# Patient Record
Sex: FEMALE | Race: WHITE | NOT HISPANIC OR LATINO | Employment: FULL TIME
[De-identification: names, ages, dates, MRNs, and addresses within clinical notes are randomized per-mention and may not be internally consistent; named-entity substitution may affect disease eponyms.]

---

## 2017-01-03 ENCOUNTER — RECORDS - HEALTHEAST (OUTPATIENT)
Dept: ADMINISTRATIVE | Facility: OTHER | Age: 37
End: 2017-01-03

## 2017-10-15 ENCOUNTER — RECORDS - HEALTHEAST (OUTPATIENT)
Dept: ADMINISTRATIVE | Facility: OTHER | Age: 37
End: 2017-10-15

## 2018-03-13 ENCOUNTER — RECORDS - HEALTHEAST (OUTPATIENT)
Dept: ADMINISTRATIVE | Facility: OTHER | Age: 38
End: 2018-03-13

## 2018-08-07 ENCOUNTER — HOME CARE/HOSPICE - HEALTHEAST (OUTPATIENT)
Dept: HOME HEALTH SERVICES | Facility: HOME HEALTH | Age: 38
End: 2018-08-07

## 2019-09-17 ENCOUNTER — APPOINTMENT (OUTPATIENT)
Age: 39
Setting detail: DERMATOLOGY
End: 2019-09-17

## 2019-09-17 VITALS — WEIGHT: 183 LBS | RESPIRATION RATE: 14 BRPM | HEIGHT: 65 IN

## 2019-09-17 DIAGNOSIS — L71.0 PERIORAL DERMATITIS: ICD-10-CM

## 2019-09-17 PROCEDURE — 99202 OFFICE O/P NEW SF 15 MIN: CPT

## 2019-09-17 PROCEDURE — OTHER COUNSELING: OTHER

## 2019-09-17 PROCEDURE — OTHER PRESCRIPTION: OTHER

## 2019-09-17 RX ORDER — CLINDAMYCIN PHOSPHATE 10 MG/ML
LOTION TOPICAL
Qty: 1 | Refills: 1 | Status: ERX | COMMUNITY
Start: 2019-09-17

## 2019-09-17 NOTE — HPI: RASH
How Severe Is Your Rash?: moderate
Is This A New Presentation, Or A Follow-Up?: Rash
Additional History: She can’t recall a specific new product

## 2019-09-17 NOTE — PROCEDURE: COUNSELING
Patient Specific Counseling (Will Not Stick From Patient To Patient): Will start topicals first as she is breastfeeding. If not improved in a few weeks she may call for an oral antibiotic
Detail Level: Detailed

## 2020-06-29 ENCOUNTER — APPOINTMENT (OUTPATIENT)
Age: 40
Setting detail: DERMATOLOGY
End: 2020-06-30

## 2020-06-29 DIAGNOSIS — L91.8 OTHER HYPERTROPHIC DISORDERS OF THE SKIN: ICD-10-CM

## 2020-06-29 PROBLEM — D48.5 NEOPLASM OF UNCERTAIN BEHAVIOR OF SKIN: Status: ACTIVE | Noted: 2020-06-29

## 2020-06-29 PROCEDURE — OTHER BIOPSY BY SHAVE METHOD: OTHER

## 2020-06-29 PROCEDURE — 99213 OFFICE O/P EST LOW 20 MIN: CPT | Mod: 25

## 2020-06-29 PROCEDURE — OTHER COUNSELING: OTHER

## 2020-06-29 PROCEDURE — 88305 TISSUE EXAM BY PATHOLOGIST: CPT

## 2020-06-29 PROCEDURE — OTHER PATHOLOGY BILLING: OTHER

## 2020-06-29 PROCEDURE — 11102 TANGNTL BX SKIN SINGLE LES: CPT

## 2020-06-29 ASSESSMENT — LOCATION SIMPLE DESCRIPTION DERM: LOCATION SIMPLE: GROIN

## 2020-06-29 ASSESSMENT — LOCATION ZONE DERM: LOCATION ZONE: TRUNK

## 2020-06-29 ASSESSMENT — LOCATION DETAILED DESCRIPTION DERM: LOCATION DETAILED: LEFT SUPRAPUBIC SKIN

## 2020-06-29 NOTE — PROCEDURE: PATHOLOGY BILLING
Immunohistochemistry (45340 and 39035) billing is not performed here. Please use the Immunohistochemistry Stain Billing plan to accomplish this. Immunohistochemistry (29558 and 87831) billing is not performed here. Please use the Immunohistochemistry Stain Billing plan to accomplish this.

## 2020-06-29 NOTE — PROCEDURE: BIOPSY BY SHAVE METHOD
Render In Bullet Format When Appropriate: No
Hemostasis: Drysol
Information: Selecting Yes will display possible errors in your note based on the variables you have selected. This validation is only offered as a suggestion for you. PLEASE NOTE THAT THE VALIDATION TEXT WILL BE REMOVED WHEN YOU FINALIZE YOUR NOTE. IF YOU WANT TO FAX A PRELIMINARY NOTE YOU WILL NEED TO TOGGLE THIS TO 'NO' IF YOU DO NOT WANT IT IN YOUR FAXED NOTE.
Was A Bandage Applied: Yes
Post-Care Instructions: I reviewed with the patient in detail post-care instructions. Patient is to keep the biopsy site dry overnight, and then apply bacitracin twice daily until healed. Patient may apply hydrogen peroxide soaks to remove any crusting.
Type Of Destruction Used: Curettage
Notification Instructions: Patient will be notified of biopsy results. However, patient instructed to call the office if not contacted within 2 weeks.
Anesthesia Volume In Cc (Will Not Render If 0): 0.3
Electrodesiccation Text: The wound bed was treated with electrodesiccation after the biopsy was performed.
Biopsy Method: Dermablade
Consent: Written consent was obtained and risks were reviewed including but not limited to scarring, infection, bleeding, scabbing, incomplete removal, nerve damage and allergy to anesthesia.
Anesthesia Type: 1% lidocaine with epinephrine
Depth Of Biopsy: dermis
X Size Of Lesion In Cm: 0
Cryotherapy Text: The wound bed was treated with cryotherapy after the biopsy was performed.
Detail Level: Detailed
Biopsy Type: H and E
Silver Nitrate Text: The wound bed was treated with silver nitrate after the biopsy was performed.
Wound Care: Petrolatum
Curettage Text: The wound bed was treated with curettage after the biopsy was performed.
Billing Type: Third-Party Bill
Dressing: bandage
Electrodesiccation And Curettage Text: The wound bed was treated with electrodesiccation and curettage after the biopsy was performed.

## 2021-02-05 ENCOUNTER — RECORDS - HEALTHEAST (OUTPATIENT)
Dept: ADMINISTRATIVE | Facility: OTHER | Age: 41
End: 2021-02-05

## 2021-02-15 ENCOUNTER — HOSPITAL ENCOUNTER (OUTPATIENT)
Dept: ULTRASOUND IMAGING | Facility: CLINIC | Age: 41
Discharge: HOME | End: 2021-02-15
Attending: OBSTETRICS & GYNECOLOGY

## 2021-02-15 ENCOUNTER — HOSPITAL ENCOUNTER (OUTPATIENT)
Dept: MAMMOGRAPHY | Facility: CLINIC | Age: 41
Discharge: HOME | End: 2021-02-15
Attending: OBSTETRICS & GYNECOLOGY

## 2021-02-15 DIAGNOSIS — N64.4 BREAST PAIN: ICD-10-CM

## 2021-02-15 DIAGNOSIS — N63.0 BREAST LUMP: ICD-10-CM

## 2022-01-05 ENCOUNTER — HOSPITAL ENCOUNTER (OUTPATIENT)
Dept: NUCLEAR MEDICINE | Facility: HOSPITAL | Age: 42
End: 2022-01-05
Attending: INTERNAL MEDICINE
Payer: COMMERCIAL

## 2022-01-05 DIAGNOSIS — K21.00 GASTROESOPHAGEAL REFLUX DISEASE WITH ESOPHAGITIS WITHOUT HEMORRHAGE: ICD-10-CM

## 2022-01-05 DIAGNOSIS — K31.84 GASTROPARESIS: ICD-10-CM

## 2022-01-05 PROCEDURE — A9541 TC99M SULFUR COLLOID: HCPCS | Performed by: INTERNAL MEDICINE

## 2022-01-05 PROCEDURE — 78264 GASTRIC EMPTYING IMG STUDY: CPT

## 2022-01-05 PROCEDURE — 343N000001 HC RX 343: Performed by: INTERNAL MEDICINE

## 2022-01-05 RX ADMIN — Medication 1 MILLICURIE: at 09:37

## 2022-01-11 ENCOUNTER — OFFICE VISIT - RIVER FALLS (OUTPATIENT)
Dept: FAMILY MEDICINE | Facility: CLINIC | Age: 42
End: 2022-01-11

## 2022-01-12 ENCOUNTER — AMBULATORY - RIVER FALLS (OUTPATIENT)
Dept: FAMILY MEDICINE | Facility: CLINIC | Age: 42
End: 2022-01-12

## 2022-01-12 ENCOUNTER — LAB REQUISITION (OUTPATIENT)
Dept: LAB | Facility: CLINIC | Age: 42
End: 2022-01-12
Payer: COMMERCIAL

## 2022-01-12 DIAGNOSIS — U07.1 COVID-19: ICD-10-CM

## 2022-01-12 PROCEDURE — U0005 INFEC AGEN DETEC AMPLI PROBE: HCPCS | Mod: ORL | Performed by: INTERNAL MEDICINE

## 2022-01-13 LAB — SARS-COV-2 RNA RESP QL NAA+PROBE: POSITIVE

## 2022-03-02 NOTE — TELEPHONE ENCOUNTER
---------------------  From: Brenda Spence LPN (Phone Messages Pool ([id number] - [place]))   To: Heraclio Grady MD;     Sent: 1/18/2022 10:09:26 AM CST  Subject: covid result/letter needed     Phone Message    PCP:   none asked for JDL      Time of Call:  10:00am       Person Calling:  pt  Phone number:  [phone]    Note:   Pt calling asking for her covid results from last week.   Informed pt she tested positive.    Pt says she knew she was going to be positive because she had a positive at home test- she [facility]    Pt says JDL told her that he would have to write her a letter on positive test to be able to travel internationally.    Pt says she is leaving Saturday and would like to  at clinic as soon as she is able.    Pt also wanting a copy of her positive result- education piece created with positive result- just needs to be printed once JDL letter is done.    Last office visit and reason:  1/11/21 telemedicine---------------------  From: Heraclio Grady MD   To: Phone Messages Pool ([id number] - [place]);     Sent: 1/18/2022 1:02:37 PM CST  Subject: RE: covid result/letter needed     Please print letter in chart that I just completed for her.Letter and positive education piece printed.  Left at .  Tried to call pt- VM is full.Tried to call pt again. VM is full.

## 2022-03-02 NOTE — NURSING NOTE
Comprehensive Intake Entered On:  1/11/2022 4:02 PM CST    Performed On:  1/11/2022 3:55 PM CST by Etta Durand               Summary   Chief Complaint :   Verbal consnet given for telephone visit. c/o cough, sore throat, Sob, abdominal pain, chills, HA, muscle aches, fatigue, runny nose that started 1/8/22. 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 Is a healthcare worker    Etta Durand - 1/11/2022 3:55 PM CST   Health Status   Allergies Verified? :   Yes   Medication History Verified? :   No   Medical History Verified? :   Yes   Pre-Visit Planning Status :   N/A   Tobacco Use? :   Never smoker   Etta Durand - 1/11/2022 3:55 PM CST   Consents   Consent for Immunization Exchange :   Consent Granted   Consent for Immunizations to Providers :   Consent Granted   Etta Durand - 1/11/2022 3:55 PM CST   Meds / Allergies   (As Of: 1/11/2022 4:02:54 PM CST)   Allergies (Active)   No Known Medication Allergies  Estimated Onset Date:   Unspecified ; Created By:   Etta Durand; Reaction Status:   Active ; Category:   Drug ; Substance:   No Known Medication Allergies ; Type:   Allergy ; Updated By:   Etta Durand; Reviewed Date:   1/11/2022 3:59 PM CST        Medication List   (As Of: 1/11/2022 4:02:54 PM CST)   Home Meds    omeprazole  :   omeprazole ; Status:   Documented ; Ordered As Mnemonic:   omeprazole 20 mg oral delayed release tablet ; Simple Display Line:   20 mg, 1 tab(s), Oral, bid, 0 Refill(s) ; Catalog Code:   omeprazole ; Order Dt/Tm:   1/11/2022 3:59:48 PM CST          ethinyl estradiol-etonogestrel  :   ethinyl estradiol-etonogestrel ; Status:   Documented ; Ordered As Mnemonic:   NuvaRing 0.120 mg-0.015 mg/24 hours vaginal ring ; Simple Display Line:   1 EA, Vaginal, q4 wks, 0 Refill(s) ; Catalog Code:   ethinyl estradiol-etonogestrel ; Order Dt/Tm:   1/11/2022 4:00:08 PM CST            Social History   Social History   (As Of: 1/11/2022 4:02:54 PM CST)   Tobacco:        Never  (less than 100 in lifetime)   (Last Updated: 1/11/2022 3:59:10 PM CST by Etta Durand)          Electronic Cigarette/Vaping:        Electronic Cigarette Use: Never.   (Last Updated: 1/11/2022 3:59:14 PM CST by Etta Durand)

## 2022-03-02 NOTE — PROGRESS NOTES
Patient Information     Name:ASPEN GARCIA      Address:      [address] [place] [id number]     Sex:Female     YOB: 1980     [phone]     MRN:[id number]     FIN:[id number]     Location:[facility]     Date of Service:01/11/2022      Primary Care Physician:       NONE ,       Attending Physician:       Heraclio Grady MD, [phone]    Dear Sir,    Aspen Fleming developed COVID-19 symptoms 2022-01-08 with a positive COVID-19 PCR test at my clinic on 2022-01-12. Her isolation period will be over today, 2022-01-18.    Sincerely,    [place] [address]    phone [phone]    fax [phone]

## 2022-03-02 NOTE — TELEPHONE ENCOUNTER
---------------------  From: Heraclio Grady MD   To: Appointment Pool ([id number]);     Cc: [facility] Message Pool ([id number] - [place]);      Sent: 1/11/2022 4:21:29 PM CST  Subject: General Message   Actions: Notify the patient for appointment      COVID19 PCRI spoke with pt and transferred to scheduling

## 2022-03-02 NOTE — PROGRESS NOTES
Chief Complaint    Verbal consnet given for telephone visit. c/o cough, sore throat, Sob, abdominal pain, chills, HA, muscle aches, fatigue, runny nose that started 1/8/22. 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 Is a healthcare worker  History of Present Illness       Today's visit was conducted via telephone due to the COVID-19 pandemic.  Patient's consent to telephone visit was obtained and documented.       Call Start Time:  1615       Call End Time:   1625       Patient is a 41-year-old whose only chronic medical problem is acid reflux who had COVID symptom onset January 8, 2022.  She tested positive home test today.  She is concerned because of travel to [facility] for a wedding January 23.  She has cough, sore throat, mild shortness of breath, abdominal pain, chills, headache, muscle aches, fatigue, runny nose.  No vomiting, diarrhea, anosmia, or nausea.  She tells me that she developed acid reflux after her first bout of COVID over a year ago.  Review of Systems       No other complaints or chronic medical problems.  Assessment/Plan       Acute COVID-19 (U07.1)          Does not appear to be candidate for monoclonal or Paxlovid.  We will arrange for COVID PCR.         Ordered:          15799 office o/p new low 30-44 min (Charge), Quantity: 1, Acute COVID-19  Chronic GERD                Chronic GERD (K21.9)          Stable.         Ordered:          86712 office o/p new low 30-44 min (Charge), Quantity: 1, Acute COVID-19  Chronic GERD           Patient Information     Name:ASPEN GARCIA      Address:      [address] [place] [address]     Sex:Female     YOB: 1980     [phone]     MRN:[id number]     FIN:[id number]     Location:[facility]     Date of Service:01/11/2022      Primary Care Physician:       NONE ,       Attending Physician:       Heraclio Grady MD, [phone]  Problem List/Past Medical History    Ongoing     Chronic GERD    Historical     No  qualifying data  Medications    NuvaRing 0.120 mg-0.015 mg/24 hours vaginal ring, 1 EA, Vaginal, q4 wks    omeprazole 20 mg oral delayed release tablet, 20 mg= 1 tab(s), Oral, bid  Allergies    No Known Medication Allergies  Social History    Smoking Status     Never smoker     Electronic Cigarette/Vaping      Electronic Cigarette Use: Never.     Tobacco      Never (less than 100 in lifetime)  Immunizations      No Immunizations recorded for patient.

## 2022-03-02 NOTE — TELEPHONE ENCOUNTER
---------------------  From: Shoaib VINCENTJalyn   Sent: 1/12/2022 2:35:18 PM CST  Subject: Curbside Testing     Pt was seen at [facility] today for covid testing per Dr. Grady. 02 Sat=99%. Pt resides in [facility] ([place], [place]). Will fax results to [facility] if positive.

## 2022-04-02 ENCOUNTER — HEALTH MAINTENANCE LETTER (OUTPATIENT)
Age: 42
End: 2022-04-02

## 2022-10-01 ENCOUNTER — HEALTH MAINTENANCE LETTER (OUTPATIENT)
Age: 42
End: 2022-10-01

## 2023-04-18 ENCOUNTER — APPOINTMENT (OUTPATIENT)
Dept: URBAN - METROPOLITAN AREA CLINIC 260 | Age: 43
Setting detail: DERMATOLOGY
End: 2023-04-18

## 2023-04-18 DIAGNOSIS — D49.2 NEOPLASM OF UNSPECIFIED BEHAVIOR OF BONE, SOFT TISSUE, AND SKIN: ICD-10-CM

## 2023-04-18 DIAGNOSIS — L82.1 OTHER SEBORRHEIC KERATOSIS: ICD-10-CM

## 2023-04-18 DIAGNOSIS — L81.4 OTHER MELANIN HYPERPIGMENTATION: ICD-10-CM

## 2023-04-18 PROCEDURE — OTHER PHOTO-DOCUMENTATION: OTHER

## 2023-04-18 PROCEDURE — OTHER PRESCRIPTION MEDICATION MANAGEMENT: OTHER

## 2023-04-18 PROCEDURE — OTHER ADDITIONAL NOTES: OTHER

## 2023-04-18 PROCEDURE — 99213 OFFICE O/P EST LOW 20 MIN: CPT

## 2023-04-18 PROCEDURE — OTHER COUNSELING: OTHER

## 2023-04-18 PROCEDURE — OTHER PRESCRIPTION: OTHER

## 2023-04-18 PROCEDURE — OTHER MIPS QUALITY: OTHER

## 2023-04-18 RX ORDER — HYDROQUINONE 4 %
CREAM (GRAM) TOPICAL BID
Qty: 30 | Refills: 2 | Status: ERX | COMMUNITY
Start: 2023-04-18

## 2023-04-18 ASSESSMENT — LOCATION DETAILED DESCRIPTION DERM
LOCATION DETAILED: LEFT PROXIMAL DORSAL FOREARM
LOCATION DETAILED: RIGHT PROXIMAL DORSAL FOREARM
LOCATION DETAILED: RIGHT INFERIOR LATERAL MALAR CHEEK
LOCATION DETAILED: RIGHT FOREHEAD

## 2023-04-18 ASSESSMENT — LOCATION ZONE DERM
LOCATION ZONE: FACE
LOCATION ZONE: ARM

## 2023-04-18 ASSESSMENT — LOCATION SIMPLE DESCRIPTION DERM
LOCATION SIMPLE: RIGHT FOREARM
LOCATION SIMPLE: RIGHT FOREHEAD
LOCATION SIMPLE: LEFT FOREARM
LOCATION SIMPLE: RIGHT CHEEK

## 2023-04-18 NOTE — PROCEDURE: PHOTO-DOCUMENTATION
Photo Preface (Leave Blank If You Do Not Want): Photographs were obtained today to monitor
Detail Level: Detailed
Details (Free Text): L Arm

## 2023-04-18 NOTE — PROCEDURE: ADDITIONAL NOTES
Detail Level: Detailed
Render Risk Assessment In Note?: no
Additional Notes: Discussed cosmetic LN2 treatment for the forearms as well as pricing for 2-10. Patient is going to consider this treatment and schedule a separate appointment if she would like to proceed.
Additional Notes: she prefers a cream for the face as opposed to LN2 although this was discussed
Additional Notes: Patient is going to take a photograph when it is inflamed and irritated as it presents as healing today, and send it to us through the portal.\\nDiscussed a possible punch biopsy, patient will consider this at the NOV

## 2023-05-03 ENCOUNTER — APPOINTMENT (OUTPATIENT)
Dept: URBAN - METROPOLITAN AREA CLINIC 260 | Age: 43
Setting detail: DERMATOLOGY
End: 2023-05-03

## 2023-05-03 DIAGNOSIS — L81.4 OTHER MELANIN HYPERPIGMENTATION: ICD-10-CM

## 2023-05-03 PROCEDURE — OTHER PRESCRIPTION MEDICATION MANAGEMENT: OTHER

## 2023-05-03 PROCEDURE — OTHER MIPS QUALITY: OTHER

## 2023-05-03 PROCEDURE — OTHER PRESCRIPTION: OTHER

## 2023-05-03 PROCEDURE — OTHER ADDITIONAL NOTES: OTHER

## 2023-05-03 PROCEDURE — OTHER BENIGN DESTRUCTION COSMETIC: OTHER

## 2023-05-03 PROCEDURE — OTHER COUNSELING: OTHER

## 2023-05-03 RX ORDER — HYDROQUINONE 4 %
CREAM (GRAM) TOPICAL BID
Qty: 30 | Refills: 2 | Status: ERX

## 2023-05-03 ASSESSMENT — LOCATION SIMPLE DESCRIPTION DERM
LOCATION SIMPLE: RIGHT WRIST
LOCATION SIMPLE: RIGHT FOREHEAD
LOCATION SIMPLE: LEFT HAND
LOCATION SIMPLE: RIGHT CHEEK
LOCATION SIMPLE: RIGHT FOREARM
LOCATION SIMPLE: LEFT FOREARM

## 2023-05-03 ASSESSMENT — LOCATION ZONE DERM
LOCATION ZONE: HAND
LOCATION ZONE: FACE
LOCATION ZONE: ARM

## 2023-05-03 ASSESSMENT — LOCATION DETAILED DESCRIPTION DERM
LOCATION DETAILED: RIGHT DORSAL WRIST
LOCATION DETAILED: LEFT PROXIMAL DORSAL FOREARM
LOCATION DETAILED: LEFT RADIAL DORSAL HAND
LOCATION DETAILED: RIGHT DISTAL DORSAL FOREARM
LOCATION DETAILED: RIGHT PROXIMAL DORSAL FOREARM
LOCATION DETAILED: LEFT ULNAR DORSAL HAND
LOCATION DETAILED: RIGHT INFERIOR LATERAL MALAR CHEEK
LOCATION DETAILED: RIGHT FOREHEAD
LOCATION DETAILED: RIGHT LATERAL DORSAL WRIST
LOCATION DETAILED: LEFT DISTAL DORSAL FOREARM

## 2023-05-03 NOTE — PROCEDURE: ADDITIONAL NOTES
Additional Notes: Patient couldnt  prescription, we will reroute to a different pharmacy. Only the arms were treated with LN2. She will use the cream for the face
Render Risk Assessment In Note?: no
Detail Level: Detailed

## 2023-05-14 ENCOUNTER — HEALTH MAINTENANCE LETTER (OUTPATIENT)
Age: 43
End: 2023-05-14

## 2023-06-06 ENCOUNTER — APPOINTMENT (OUTPATIENT)
Dept: URBAN - METROPOLITAN AREA CLINIC 260 | Age: 43
Setting detail: DERMATOLOGY
End: 2023-06-07

## 2023-06-06 VITALS — WEIGHT: 166 LBS | HEIGHT: 65 IN

## 2023-06-06 DIAGNOSIS — L81.8 OTHER SPECIFIED DISORDERS OF PIGMENTATION: ICD-10-CM

## 2023-06-06 PROCEDURE — OTHER ADDITIONAL NOTES: OTHER

## 2023-06-06 NOTE — HPI: EVALUATION OF SKIN LESION(S)
What Type Of Note Output Would You Prefer (Optional)?: Standard Output
How Severe Are Your Spot(S)?: moderate
Have Your Spot(S) Been Treated In The Past?: has been treated
Hpi Title: Evaluation of Skin Lesions
Additional History: Patient presents today for follow up on cosmetic solar lentigo treatment. She is happy with the tmt

## 2023-06-06 NOTE — PROCEDURE: ADDITIONAL NOTES
Additional Notes: Follow up from cosmetic treatment. Recommend vitamin E capsules (poke a hole in them with a needle and apply the serum inside). Patient is using maderma as well. The areas are healing well and if not improved in 1-2 mo rtc\\nThis is A COSMETIC FOLLOW UP DO NOT BILL
Render Risk Assessment In Note?: no
Detail Level: Simple

## 2024-03-03 ENCOUNTER — HEALTH MAINTENANCE LETTER (OUTPATIENT)
Age: 44
End: 2024-03-03

## 2024-07-21 ENCOUNTER — HEALTH MAINTENANCE LETTER (OUTPATIENT)
Age: 44
End: 2024-07-21

## 2024-11-13 ENCOUNTER — APPOINTMENT (OUTPATIENT)
Dept: URBAN - METROPOLITAN AREA CLINIC 260 | Age: 44
Setting detail: DERMATOLOGY
End: 2024-11-13

## 2024-11-13 VITALS — HEIGHT: 65 IN | WEIGHT: 170 LBS

## 2024-11-13 DIAGNOSIS — L71.0 PERIORAL DERMATITIS: ICD-10-CM

## 2024-11-13 DIAGNOSIS — L30.9 DERMATITIS, UNSPECIFIED: ICD-10-CM

## 2024-11-13 PROCEDURE — OTHER PRESCRIPTION MEDICATION MANAGEMENT: OTHER

## 2024-11-13 PROCEDURE — OTHER PRESCRIPTION: OTHER

## 2024-11-13 PROCEDURE — OTHER MIPS QUALITY: OTHER

## 2024-11-13 PROCEDURE — OTHER COUNSELING: OTHER

## 2024-11-13 PROCEDURE — OTHER PHOTO-DOCUMENTATION: OTHER

## 2024-11-13 PROCEDURE — 99214 OFFICE O/P EST MOD 30 MIN: CPT

## 2024-11-13 RX ORDER — TRIAMCINOLONE ACETONIDE 1 MG/G
OINTMENT TOPICAL
Qty: 30 | Refills: 2 | Status: ERX | COMMUNITY
Start: 2024-11-13

## 2024-11-13 RX ORDER — CLINDAMYCIN PHOSPHATE 10 MG/ML
LOTION TOPICAL
Qty: 60 | Refills: 11 | Status: ERX | COMMUNITY
Start: 2024-11-13

## 2024-11-13 ASSESSMENT — LOCATION ZONE DERM
LOCATION ZONE: FACE
LOCATION ZONE: VULVA

## 2024-11-13 ASSESSMENT — LOCATION DETAILED DESCRIPTION DERM
LOCATION DETAILED: LEFT LABIA MINORA
LOCATION DETAILED: LEFT SUPERIOR MEDIAL BUCCAL CHEEK

## 2024-11-13 ASSESSMENT — LOCATION SIMPLE DESCRIPTION DERM
LOCATION SIMPLE: LEFT CHEEK
LOCATION SIMPLE: VULVA

## 2024-11-13 NOTE — PROCEDURE: PRESCRIPTION MEDICATION MANAGEMENT
Render In Strict Bullet Format?: No
Initiate Treatment: Clindamycin 1 % lotion. Apply to face twice daily until resolved
Detail Level: Simple
Plan: Follow up if not resolving
Initiate Treatment: Triamcinolone acetonide 0.1 % topical ointment.  Apply to affected areas on body twice daily for up to 2 weeks. Take 1 week off before restarting if needed

## 2024-11-13 NOTE — HPI: RASH
Is This A New Presentation, Or A Follow-Up?: Rash
Additional History: Patient thinks it is the same rash she had years ago- Perioral dermatitis. She used a cream that worked well last time.

## 2024-11-13 NOTE — HPI: SKIN LESION
What Type Of Note Output Would You Prefer (Optional)?: Standard Output
Is This A New Presentation, Or A Follow-Up?: Skin Lesions
Additional History: Spots on inner labia. She saw [facility] and did a swab which is still pending. She has these lesions before about a year ago after having IUD placed and the swab did not come back a HSV but now that they are back she is concerned. A year ago went ago with a steroid cream.
Patient

## 2024-12-03 ENCOUNTER — RX ONLY (RX ONLY)
Age: 44
End: 2024-12-03

## 2024-12-03 RX ORDER — DOXYCYCLINE 50 MG/1
TABLET, FILM COATED ORAL
Qty: 60 | Refills: 0 | Status: ERX | COMMUNITY
Start: 2024-12-03